# Patient Record
Sex: MALE | Race: OTHER | NOT HISPANIC OR LATINO | URBAN - METROPOLITAN AREA
[De-identification: names, ages, dates, MRNs, and addresses within clinical notes are randomized per-mention and may not be internally consistent; named-entity substitution may affect disease eponyms.]

---

## 2020-03-25 ENCOUNTER — EMERGENCY (EMERGENCY)
Facility: HOSPITAL | Age: 35
LOS: 1 days | Discharge: HOME | End: 2020-03-25
Admitting: INTERNAL MEDICINE
Payer: MEDICAID

## 2020-03-25 VITALS
DIASTOLIC BLOOD PRESSURE: 80 MMHG | TEMPERATURE: 98 F | OXYGEN SATURATION: 98 % | HEART RATE: 80 BPM | SYSTOLIC BLOOD PRESSURE: 130 MMHG | RESPIRATION RATE: 16 BRPM

## 2020-03-25 VITALS
OXYGEN SATURATION: 97 % | SYSTOLIC BLOOD PRESSURE: 126 MMHG | HEART RATE: 83 BPM | DIASTOLIC BLOOD PRESSURE: 88 MMHG | RESPIRATION RATE: 16 BRPM | TEMPERATURE: 98 F

## 2020-03-25 DIAGNOSIS — R05 COUGH: ICD-10-CM

## 2020-03-25 DIAGNOSIS — B34.9 VIRAL INFECTION, UNSPECIFIED: ICD-10-CM

## 2020-03-25 PROCEDURE — 99284 EMERGENCY DEPT VISIT MOD MDM: CPT

## 2020-03-25 NOTE — ED ADULT TRIAGE NOTE - CHIEF COMPLAINT QUOTE
Pt c/o cough and throat pain. Denies any fever Spoke with pt and informed that RAH in the office and that she has not yet reviewed the urine C&S results yet but I will send this msg asking that she do so and we will get back to her with a response. Tasked to [facility]. Judd velasco.      Collected:  4/19/2019

## 2020-03-25 NOTE — ED ADULT NURSE NOTE - NSIMPLEMENTINTERV_GEN_ALL_ED
Implemented All Universal Safety Interventions:  [place] to call system. Call bell, personal items and telephone within reach. Instruct patient to call for assistance. Room bathroom lighting operational. Non-slip footwear when patient is off stretcher. Physically safe environment: no spills, clutter or unnecessary equipment. Stretcher in lowest position, wheels locked, appropriate side rails in place.

## 2020-03-25 NOTE — ED PROVIDER NOTE - NSFOLLOWUPINSTRUCTIONS_ED_ALL_ED_FT
You may have Coronavirus, or any of the other many colds that are going around now,     COVID-19 testing are currently being prioritized at [facility] for admitted patients.     Patients that are stable for discharge from the ED will have COVID-19 testing performed within 7-10 days at which time most symptoms should improve.  For that reason, although, there is suspicion that you may have Coronavirus, you were not tested today. Please follow instruction on provided COVID-19 discharge educational forms and self quarantine for 14 days.     Return to the ED immediately if you have shortness of breath, fever, pain, weakness, vomiting any concerns.    1. STAY HOME for 14 DAYS  2. Minimize Human contact to ONLY ESSENTIAL  3. Every time you wash your hands, sing the HAPPY BIRTHDAY Song so you know you're washing long enough.  Make sure to scrub the webspace between your fingers.  4. DRINK 1-3 Liters of fluids day x at least 5 days to remain hydrated. Your fatigue, lightheadedness, and body aches will decrease and your fever has a better chance of breaking if you are well hydrated.    5. For your Fever and Body aches takes Tylenol 650-100mg every 4 to 6 hours (max 4000mg/day). Try not to use ibuprofen, aspirin or naproxen (Advil, Motrin or Aleve) as these may worsen Coronavirus infection.  7. RETURN TO THE ER IMMEDIATELY IF YOU HAVE WORSENING SHORTNESS OF BREATH

## 2020-03-25 NOTE — ED PROVIDER NOTE - PHYSICAL EXAMINATION
Gen - WDWN, NAD, comfortable and non-toxic appearing  Skin - warm, dry, intact   HEENT - Airway patent, neck supple. Uvula midline. No oropharyngeal swelling/edema.  CV - S1S2, R/R/R  Resp - Breathing comfortably on RA. Lungs CTA B/L.  GI - Soft, NT/ND  MS - Neck supple, no extremity swelling/edema  Neuro - AxOx3, clear speech, grossly unremarkable.  Psych - Calm and cooperative. Normal mood and affect.

## 2020-03-25 NOTE — ED PROVIDER NOTE - CHPI ED SYMPTOMS NEG
no headache/no drooling, no difficulty swallowing, no abdominal pain, no N/V/D, no rhinorrhea, no dizziness, no rash/no chest pain/no chills/no shortness of breath/no fever

## 2020-03-25 NOTE — ED PROVIDER NOTE - CLINICAL SUMMARY MEDICAL DECISION MAKING FREE TEXT BOX
The patient is afebrile, nontoxic and well-appearing. He is sitting comfortably in NAD. His vital signs are stable and his O2 sat is 97% on RA. Will D/C on self-quarantine and supportive care instructions. Strict return precautions reviewed with pt in which pt verbalizes understanding and agrees to.

## 2020-03-25 NOTE — ED PROVIDER NOTE - OBJECTIVE STATEMENT
35 y/o male with no pertinent PMHx presents to the ED with complaints of dry cough and sore throat since last night. Patient has been taking Tylenol but without relief. Tolerating PO intake without difficulty. Denies recent international travel or exposure to known COVID-19. Denies fever, chills, chest pain, SOB, difficulty swallowing, drooling, abdominal pain, N/V/D, rhinorrhea, headache, dizziness, rash.

## 2020-03-25 NOTE — ED PROVIDER NOTE - PATIENT PORTAL LINK FT
You can access the FollowMyHealth Patient Portal offered by [facility] by registering at the following website: [email or web address]. By joining [employer]’s FollowMyHealth portal, you will also be able to view your health information using other applications (apps) compatible with our system.
